# Patient Record
Sex: MALE | Race: WHITE | Employment: UNEMPLOYED | URBAN - METROPOLITAN AREA
[De-identification: names, ages, dates, MRNs, and addresses within clinical notes are randomized per-mention and may not be internally consistent; named-entity substitution may affect disease eponyms.]

---

## 2024-05-06 ENCOUNTER — OFFICE VISIT (OUTPATIENT)
Dept: PRIMARY CARE | Facility: CLINIC | Age: 54
End: 2024-05-06
Payer: MEDICAID

## 2024-05-06 VITALS
BODY MASS INDEX: 26.33 KG/M2 | OXYGEN SATURATION: 94 % | WEIGHT: 158 LBS | DIASTOLIC BLOOD PRESSURE: 92 MMHG | HEIGHT: 65 IN | HEART RATE: 77 BPM | SYSTOLIC BLOOD PRESSURE: 147 MMHG

## 2024-05-06 DIAGNOSIS — N40.1 BENIGN PROSTATIC HYPERPLASIA WITH LOWER URINARY TRACT SYMPTOMS, SYMPTOM DETAILS UNSPECIFIED: ICD-10-CM

## 2024-05-06 DIAGNOSIS — F41.9 ANXIETY: ICD-10-CM

## 2024-05-06 DIAGNOSIS — L21.0 DANDRUFF IN ADULT: ICD-10-CM

## 2024-05-06 DIAGNOSIS — Z00.00 ANNUAL PHYSICAL EXAM: Primary | ICD-10-CM

## 2024-05-06 PROCEDURE — 93000 ELECTROCARDIOGRAM COMPLETE: CPT | Performed by: INTERNAL MEDICINE

## 2024-05-06 PROCEDURE — 99214 OFFICE O/P EST MOD 30 MIN: CPT | Performed by: INTERNAL MEDICINE

## 2024-05-06 PROCEDURE — 99386 PREV VISIT NEW AGE 40-64: CPT | Performed by: INTERNAL MEDICINE

## 2024-05-06 RX ORDER — ESCITALOPRAM OXALATE 10 MG/1
10 TABLET ORAL DAILY
Qty: 90 TABLET | Refills: 1 | Status: SHIPPED | OUTPATIENT
Start: 2024-05-06 | End: 2024-11-02

## 2024-05-06 RX ORDER — TAMSULOSIN HYDROCHLORIDE 0.4 MG/1
0.4 CAPSULE ORAL DAILY
Qty: 90 CAPSULE | Refills: 3 | Status: SHIPPED | OUTPATIENT
Start: 2024-05-06 | End: 2025-05-06

## 2024-05-06 RX ORDER — CLOBETASOL PROPIONATE 0.46 MG/ML
SOLUTION TOPICAL 2 TIMES DAILY
Qty: 60 ML | Refills: 2 | Status: SHIPPED | OUTPATIENT
Start: 2024-05-06 | End: 2025-05-06

## 2024-05-06 NOTE — PROGRESS NOTES
"Subjective   Patient ID: Camron Sauceda is a 53 y.o. male who presents for the following  NEW PHYSICAL 5/6/2024  Assessment/Plan   BPH; flomax started 5/6/2024    TINNITUS: STABLE , refer to ent    Eczema/dandruff itching of the scalp: clobetasol , refer with dermatology    Left abdominal skin cyst:     Suicidal ideations but not currently. No plan to hurt himself. He is seeking help from a male therapist or male psychiatrist. Start lexapro     Refer for colonoscopy       CBC, CMP, lipid panel, a1c, tsh, vitamin d  PSA    HPI    Tinnitus: since he was a child. He wears ear plugs.     Hx suicidal ideation and alcohol abuse: patient was historically an alcoholic for 18 years. Last drink November 3, 2009. He uses marijuana now and he is staring to use more. He enjoys playing darts. He does not have at best relationship with his wife. He does feel that he brings it upon himself allowing people to \"beat on\" on him. He does enjoy talking to his youngest daughter who has now moved to [facility]. He did try Seroquel in the past but he did not appreciate the way it made him feel. He is tearful at times as we talk about his home situation. IT does not sound like he has a lot of support at home    Bph: Slow peeing. He says that he can barely feel urination    Hx of smoking: still vapes at this time     social: patient denies any smoking, drug or alcohol abuse    family history: patient is adopted     surgical history: none          Visit Vitals  BP (!) 147/92   Pulse 77   Ht 1.651 m (5' 5\")   Wt 71.7 kg (158 lb)   SpO2 94%   BMI 26.29 kg/m²   Smoking Status Every Day   BSA 1.81 m²     PHYSICAL EXAM   General appearance: Alert and in no acute distress. speech is clear and coherent  HEENT: Sclera and conjunctiva white, EOMI, uvela midline, no mouth lesions. PERRLA,  nasal turbinates are not swollen without exudate. TM's Hugo with cone of light, external ear canal with scant cerumen. No head trauma  Neck: no carotid bruits or " thyromegaly. no lymphadenopathy   Respiratory : No respiratory distress, normal respiratory rhythm and effort. Clear bilateral breath sounds. No wheezing or rhonchi.   Cardiovascular: heart rate regular, S1, S2. no murmurs. no Lower extremity edema  Skin inspection: Normal skin color and pigmentation, normal skin turgor and no visible rash, induration, or cellulitis  MSK: 5/5 strength upper and lower extremities without gait abnormalities. no loss of muscle mass   Neuro: 2-12 CN grossly intact.  no slurred speech. no lateralizing deficit  Psychiatric Orientation: Oriented to person, place, and time. no depression, homicidal or suicidal thoughts, normal affect  Abdomen: soft, none tender, none distended. no organomegaly      REVIEW OF SYSTEMS     Constitutional: not feeling tired and no fever, chills or sweats. Denies weight loss    HEENT: no earache and no sore throat. no blurred vision and or double vision. no headache  Cardiovascular: no exertional chest pain, no palpitations, no lower extremity edema and no intermittent leg claudication.   Lungs: Denies shortness of breath, exertional dyspnea, wheezing  Gastrointestinal: no change in bowel habits, no diarrhea, no nausea, no vomiting and no abdominal pain. Denies Melena, brbpr or dark stool  Musculoskeletal: no myalgias, no muscle weakness and no limb swelling.   Skin: no rashes, no change in skin color and pigmentation and no skin lumps.   Neurological: no headaches, no seizures, no numbness, no lateralizing deficits and no fainting.   Psychiatric: no depression and no anxiety.   Urine: denies polyuria, hematuria, dysuria   Endocrine: no cold intolerance, no heat intolerance    Allergies   Allergen Reactions    Diphenhydramine-Zinc Acetate Hives    Penicillins Hives     itch/rash       No current outpatient medications on file.     No current facility-administered medications for this visit.       Objective     No results found for any previous visit.        Radiology: Reviewed imaging in powerchart.  No results found.    No family history on file.  Social History     Socioeconomic History    Marital status: Single     Spouse name: None    Number of children: None    Years of education: None    Highest education level: None   Occupational History    None   Tobacco Use    Smoking status: Every Day    Smokeless tobacco: Never   Vaping Use    Vaping status: Every Day   Substance and Sexual Activity    Alcohol use: Not Currently    Drug use: Yes     Types: Marijuana    Sexual activity: None   Other Topics Concern    None   Social History Narrative    None     Social Determinants of Health     Financial Resource Strain: At Risk (2022)    Received from [employer]     Financial Resource Strain     Financial Resource Strain: 2   Food Insecurity: Not on File (2022)    Received from [employer]     Food Insecurity     Food: 0   Transportation Needs: Not at Risk (2022)    Received from [employer]     Transportation Needs     Transportation: 1   Physical Activity: Not on File (2022)    Received from [employer]     Physical Activity     Physical Activity: 0   Stress: Not at Risk (2022)    Received from [employer]     Stress     Stress: 1   Social Connections: Not at Risk (2022)    Received from [employer]     Social Connections     Social Connections and Isolation: 1   Intimate Partner Violence: Not on file   Housing Stability: Not at Risk (2022)    Received from [employer]     Housing Stability     Housin     No past medical history on file.  No past surgical history on file.    Charting was completed using voice recognition technology and may include unintended errors.

## 2024-05-07 ENCOUNTER — TELEPHONE (OUTPATIENT)
Dept: PRIMARY CARE | Facility: CLINIC | Age: 54
End: 2024-05-07
Payer: MEDICAID

## 2024-05-07 NOTE — TELEPHONE ENCOUNTER
----- Message from Zack Saavedra DO sent at 5/7/2024  8:57 AM EDT -----  Please have patient call for a therapist at [facility]. [phone]    If he has trouble getting in, let me know and I can reach out on his behalf

## 2024-10-09 ENCOUNTER — APPOINTMENT (OUTPATIENT)
Dept: PRIMARY CARE | Facility: CLINIC | Age: 54
End: 2024-10-09
Payer: MEDICAID

## 2024-10-09 VITALS
BODY MASS INDEX: 25.83 KG/M2 | DIASTOLIC BLOOD PRESSURE: 86 MMHG | WEIGHT: 155 LBS | HEART RATE: 59 BPM | SYSTOLIC BLOOD PRESSURE: 129 MMHG | OXYGEN SATURATION: 95 % | HEIGHT: 65 IN

## 2024-10-09 DIAGNOSIS — F33.1 MODERATE EPISODE OF RECURRENT MAJOR DEPRESSIVE DISORDER: Primary | ICD-10-CM

## 2024-10-09 DIAGNOSIS — F41.9 ANXIETY: ICD-10-CM

## 2024-10-09 PROCEDURE — 99214 OFFICE O/P EST MOD 30 MIN: CPT | Performed by: INTERNAL MEDICINE

## 2024-10-09 PROCEDURE — 3008F BODY MASS INDEX DOCD: CPT | Performed by: INTERNAL MEDICINE

## 2024-10-09 RX ORDER — ARIPIPRAZOLE 2 MG/1
2 TABLET ORAL DAILY
Qty: 30 TABLET | Refills: 0 | Status: SHIPPED | OUTPATIENT
Start: 2024-10-09 | End: 2024-10-09 | Stop reason: SDUPTHER

## 2024-10-09 RX ORDER — ESCITALOPRAM OXALATE 20 MG/1
20 TABLET ORAL DAILY
Qty: 90 TABLET | Refills: 2 | Status: SHIPPED | OUTPATIENT
Start: 2024-10-09 | End: 2024-10-09 | Stop reason: SDUPTHER

## 2024-10-09 RX ORDER — ARIPIPRAZOLE 2 MG/1
2 TABLET ORAL DAILY
Qty: 30 TABLET | Refills: 3 | Status: SHIPPED | OUTPATIENT
Start: 2024-10-09 | End: 2025-02-06

## 2024-10-09 RX ORDER — ESCITALOPRAM OXALATE 10 MG/1
10 TABLET ORAL DAILY
Qty: 90 TABLET | Refills: 2 | Status: SHIPPED | OUTPATIENT
Start: 2024-10-09 | End: 2025-07-06

## 2024-10-09 NOTE — PROGRESS NOTES
"Subjective   Patient ID: Camron Sauceda is a 53 y.o. male who presents for the following  Chronic disease follow up    NEW PHYSICAL 5/6/2024  Assessment/Plan   Major depressive disorder, with Suicidal ideations but no plan. No plan to hurt himself at the moment . He is seeking help from a male therapist or male psychiatrist. Continue lexapro 10mg daily   Abilify 2mg daily   Follow up with psychiatry, referral given to Dr Chan     Go to the ED if thoughts become stronger driving him to have a plan to hurt himself or other.     Recommend 1 month follow up    Other medical issues see below    BPH; flomax started 5/6/2024    TINNITUS: STABLE , refer to ent    Eczema/dandruff itching of the scalp: clobetasol , refer with dermatology     Hx alcohol use disorder 15 years ago, but is sober since.     Refer for colonoscopy     C     HPI    Hx suicidal ideation and alcohol abuse: patient was historically an alcoholic for 18 years. Last drink November 3, 2009. He uses marijuana now and he is staring to use more. He enjoys playing darts. He does not have at best relationship with his wife. He does feel that he brings it upon himself allowing people to \"beat on\" on him. He does enjoy talking to his youngest daughter is in [place], [place] and his oldest daughter moved to [facility]. He did try Seroquel in the past but he did not appreciate the way it made him feel. He is tearful at times as we talk about his home situation. IT does not sound like he has a lot of support at home. He is with a \"person\" for 30 years in a relationship and its not going quite well.     He is on lexapro 10mg daily . He says that he does have dark thoughts but killing himself but does not have a plan to do so. Denies any thoughts of delusion or grandeur. His mind will race at times.     He was unable to get in with a psychiatrist or therapist at this time      Other medical issues, see below     Tinnitus: since he was a child. He wears ear plugs. " "    Bph: Slow peeing. He says that he can barely feel urination    Hx of smoking:  vapes at this time     social: patient denies any smoking, drug or alcohol abuse    family history: patient is adopted     surgical history: none          Visit Vitals  /86 (BP Location: Left arm, Patient Position: Sitting, BP Cuff Size: Adult)   Pulse 59   Ht 1.651 m (5' 5\")   Wt 70.3 kg (155 lb)   SpO2 95%   BMI 25.79 kg/m²   Smoking Status Former   BSA 1.8 m²     PHYSICAL EXAM   General appearance: Alert and in no acute distress. speech is clear and coherent  HEENT: Sclera and conjunctiva white, EOMI, uvela midline, no mouth lesions. PERRLA,  nasal turbinates are not swollen without exudate. TM's Hugo with cone of light, external ear canal with scant cerumen. No head trauma  Neck: no carotid bruits or thyromegaly. no lymphadenopathy   Respiratory : No respiratory distress, normal respiratory rhythm and effort. Clear bilateral breath sounds. No wheezing or rhonchi.   Cardiovascular: heart rate regular, S1, S2. no murmurs. no Lower extremity edema   MSK: 5/5 strength upper and lower extremities without gait abnormalities. no loss of muscle mass   Neuro: 2-12 CN grossly intact.  no slurred speech. no lateralizing deficit  Psychiatric Orientation: Oriented to person, place, and time. no   homicidal or suicidal thoughts. He is down but not tearful but not really open to talking in too much detail today.       REVIEW OF SYSTEMS     Constitutional: not feeling tired and no fever, chills or sweats. Denies weight loss     Cardiovascular: no exertional chest pain, no palpitations, no lower extremity edema and no intermittent leg claudication.   Lungs: Denies shortness of breath, exertional dyspnea, wheezing  Gastrointestinal: no change in bowel habits, no diarrhea, no nausea, no vomiting and no abdominal pain. Denies Melena, brbpr or dark stool  Musculoskeletal: no myalgias, no muscle weakness and no limb swelling.   Skin: no rashes, " no change in skin color and pigmentation and no skin lumps.   Neurological: no headaches, no seizures, no numbness, no lateralizing deficits and no fainting.   Psychiatric: no depression and no anxiety.   Urine: denies polyuria, hematuria, dysuria   Endocrine: no cold intolerance, no heat intolerance    Allergies   Allergen Reactions    Diphenhydramine-Zinc Acetate Hives    Penicillins Hives     itch/rash       Current Outpatient Medications   Medication Sig Dispense Refill    escitalopram (Lexapro) 10 mg tablet Take 1 tablet (10 mg) by mouth once daily. 90 tablet 1    tamsulosin (Flomax) 0.4 mg 24 hr capsule Take 1 capsule (0.4 mg) by mouth once daily. 90 capsule 3    clobetasol (Temovate) 0.05 % external solution Apply topically 2 times a day. (Patient not taking: Reported on 10/9/2024) 60 mL 2     No current facility-administered medications for this visit.       Objective     No visits with results within 4 Month(s) from this visit.   Latest known visit with results is:   No results found for any previous visit.       Radiology: Reviewed imaging in powerchart.  No results found.    No family history on file.  Social History     Socioeconomic History    Marital status: Single   Tobacco Use    Smoking status: Former     Types: Cigarettes    Smokeless tobacco: Never   Vaping Use    Vaping status: Every Day    Substances: Nicotine    Devices: Refillable tank   Substance and Sexual Activity    Alcohol use: Not Currently    Drug use: Yes     Types: Marijuana     Social Determinants of Health     Financial Resource Strain: Not on File (2/16/2022)    Received from [employer]    Financial Resource Strain     Financial Resource Strain: 0   Recent Concern: Financial Resource Strain - At Risk (2/16/2022)    Received from [employer]    Financial Resource Strain     Financial Resource Strain: 2   Food Insecurity: Not on File (9/26/2024)    Received from [employer]    Food Insecurity     Food: 0   Transportation Needs: Not on File  (2022)    Received from [employer]    Transportation Needs     Transportation: 0   Physical Activity: Not on File (2022)    [facility]    Physical Activity     Physical Activity: 0   Stress: Not on File (2022)    Received from [employer]    Stress     Stress: 0   Social Connections: Not on File (2022)    Received from [employer]    Social Connections     Connectedness: 0   Housing Stability: Not on File (2022)    Received from [employer]    Housing Stability     Housin     No past medical history on file.  No past surgical history on file.    Charting was completed using voice recognition technology and may include unintended errors.

## 2025-07-03 ENCOUNTER — TELEPHONE (OUTPATIENT)
Dept: PRIMARY CARE | Facility: CLINIC | Age: 55
End: 2025-07-03
Payer: MEDICAID

## 2025-07-03 DIAGNOSIS — N40.0 BENIGN PROSTATIC HYPERPLASIA, UNSPECIFIED WHETHER LOWER URINARY TRACT SYMPTOMS PRESENT: Primary | ICD-10-CM

## 2025-07-03 NOTE — TELEPHONE ENCOUNTER
He was having a hard time going to the bathroom     tamsulosin (Flomax) 0.4 mg 24 hr capsule one a day     Requesting a refill on this    [facility]

## 2025-07-04 RX ORDER — TAMSULOSIN HYDROCHLORIDE 0.4 MG/1
0.4 CAPSULE ORAL DAILY
Qty: 30 CAPSULE | Refills: 1 | Status: SHIPPED | OUTPATIENT
Start: 2025-07-04 | End: 2026-07-04

## 2025-08-28 ENCOUNTER — TELEPHONE (OUTPATIENT)
Dept: PRIMARY CARE | Facility: CLINIC | Age: 55
End: 2025-08-28
Payer: MEDICAID

## 2025-08-28 DIAGNOSIS — N40.0 BENIGN PROSTATIC HYPERPLASIA, UNSPECIFIED WHETHER LOWER URINARY TRACT SYMPTOMS PRESENT: ICD-10-CM

## 2025-08-28 RX ORDER — TAMSULOSIN HYDROCHLORIDE 0.4 MG/1
0.4 CAPSULE ORAL DAILY
Qty: 30 CAPSULE | Refills: 1 | Status: SHIPPED | OUTPATIENT
Start: 2025-08-28 | End: 2026-08-28

## 2025-09-26 ENCOUNTER — APPOINTMENT (OUTPATIENT)
Dept: PRIMARY CARE | Facility: CLINIC | Age: 55
End: 2025-09-26
Payer: MEDICAID